# Patient Record
(demographics unavailable — no encounter records)

---

## 2025-07-03 NOTE — HISTORY OF PRESENT ILLNESS
[Patient reported mammogram was normal] : Patient reported mammogram was normal [Patient reported colonoscopy was normal] : Patient reported colonoscopy was normal [LMP unknown] : LMP unknown [FreeTextEntry1] : pt, , presents for annual exam. denies complaints at this time. states she is doing well with mirena IUD and is to be taken out next year. LMP unknown due to IUD.  [Mammogramdate] : 2023 [ColonoscopyDate] : 2024 [PGHxTotal] : 2 [[facility] : [facility] [PGHxPremature] : 0 [PGHxAbortions] : 0 [[facility]

## 2025-07-03 NOTE — PLAN
[FreeTextEntry1] : pap sent to lab. SBE encouraged monthly. mammo script given to pt. pt to f/u prn.

## 2025-07-03 NOTE — PHYSICAL EXAM
[MA] : MA [Appropriately responsive] : appropriately responsive [Alert] : alert [No Acute Distress] : no acute distress [No Lymphadenopathy] : no lymphadenopathy [Regular Rate Rhythm] : regular rate rhythm [No Murmurs] : no murmurs [Clear to Auscultation B/L] : clear to auscultation bilaterally [Soft] : soft [Non-tender] : non-tender [Non-distended] : non-distended [No HSM] : No HSM [No Lesions] : no lesions [No Mass] : no mass [Oriented x3] : oriented x3 [FreeTextEntry2] : Melanie [Examination Of The Breasts] : a normal appearance [Breast Palpation Diffuse Fibrous Tissue Bilateral] : fibrocystic changes [No Masses] : no breast masses were palpable [Labia Majora] : normal [Labia Minora] : normal [IUD String] : an IUD string was noted [Normal] : normal [Uterine Adnexae] : normal

## 2025-07-23 NOTE — ASSESSMENT
[FreeTextEntry1] : 48 yo female presenting today with left plantar fasciitis. Recommend non-surgical management -Discussed with patient that right plantar fascial CSI not recommend due to increased risk of calcaneal stress fractures due to thinning of calcaneal fat pad. Patient understands. -Rx plantar fasciitis night time splint given -Demonstrated plantar fascia stretching/strengthening exercises with patient -Rx PT given today -Discussed risks and benefits of plantar fascial PRP injections. Discussed with patient that if conservative management fails that patient may be indicated for PRP. -F/u 6 weeks   The diagnosis was explained in detail. The potential non-surgical and surgical treatments were reviewed. The relative risks and benefits of each option were considered relative to the patients age, activity level, medical history, symptom severity and previously attempted treatments.   The patient was advised to consult with their primary medical provider prior to initiation of any new medications to reduce the risk of adverse effects specific to their long-term home medications and medical history.   The patient's current medications management of their orthopedic diagnosis was reviewed today:   1) We discussed a comprehensive treatment plan that included possible pharmaceutical management involving the use of prescription strength medications including but not limited to options as oral Naprosyn 500mg BID, once daily Meloxicam 15 mg, or 500-650 mg Tylenol versus over-the-counter oral medications and topical prescriptions NSAID Pennsaid vs over the counter Voltaren gel.   2) There is a moderate risk of morbidity with further treatment, especially from use of prescription strength medications and possible side effects of these medications which include upset stomach with oral medications, skin reactions to topical medications and cardiac/renal issues with long term use.   3) I recommended that the patient follow-up with their medical physician to discuss any significant specific potential issues with long term medication use such as interactions with current medications or with exacerbation of underlying medical comorbidities.   4) The benefits and risks associated with use of injectable, oral or topical, prescription and over the counter anti-inflammatory medications were discussed with the patient. The patient voiced understanding of the risks including but not limited to bleeding, stroke, kidney dysfunction, heart disease, and were referred to the black box warning label for further information  Entered by Tremaine Grover PA-C acting as scribe. Dr. Drew Attestation The documentation recorded by the scribe, in my presence, accurately reflects the service I, Dr. Drew, personally performed, and the decisions made by me with my edits as appropriate.

## 2025-07-23 NOTE — HISTORY OF PRESENT ILLNESS
[Gradual] : gradual [Sudden] : sudden [8] : 8 [Throbbing] : throbbing [Intermittent] : intermittent [Household chores] : household chores [Leisure] : leisure [Work] : work [Social interactions] : social interactions [Nothing helps with pain getting better] : Nothing helps with pain getting better [Standing] : standing [de-identified] : Patient presents to office today for left foot pain. There was non specific injury. Onset of pain was sometime in June 2025. Xrays were taken at [facility] on 7/15/25 of Left Ankle and Left Foot. Patient describes pain as sharp in the bottom middle, and top of left foot. Pain level is 8/10 at times. It improves with wearing a sneaker, and worse with flip flops. [] : no [FreeTextEntry1] : left foot [FreeTextEntry3] : June 2025 [FreeTextEntry5] : NKI [FreeTextEntry9] : wearing sneaker [de-identified] : Xrays at [facility] 7/15/25

## 2025-07-23 NOTE — PHYSICAL EXAM
[de-identified] : Examination of the left foot and ankle is as follows: INSPECTION: no abrasion, no laceration, no swelling, no erythema, no ecchymosis, no gross deformity PALPATION: plantar fascia tenderness, plantar heels tenderness ROM: dorsiflexion 20 degrees, plantar flexion 40 degrees, inversion 30 degrees, eversion 20 degrees STRENGTH: dorsiflexion 5/5. plantarflexion 5/5, inversion 5/5, eversion 5/5, EHL 5/5, FHL 5/5 VASCULAR: dorsalis pedis pulse: 2+, posterior tibialis pulse 2+ NEURO: sensation present to light touch in all distributions GAIT: mildly antalgic, but patient ambulates without assistive device.   X-rays of the left foot is as follows: Meigs Foot 3 View AP/Lateral/Oblique: There are no fractures, subluxations or dislocations. No significant abnormalities are seen.